# Patient Record
Sex: MALE | Race: AMERICAN INDIAN OR ALASKA NATIVE
[De-identification: names, ages, dates, MRNs, and addresses within clinical notes are randomized per-mention and may not be internally consistent; named-entity substitution may affect disease eponyms.]

---

## 2022-06-14 ENCOUNTER — HOSPITAL ENCOUNTER (EMERGENCY)
Dept: HOSPITAL 5 - ED | Age: 52
LOS: 1 days | Discharge: HOME | End: 2022-06-15
Payer: SELF-PAY

## 2022-06-14 VITALS — SYSTOLIC BLOOD PRESSURE: 150 MMHG | DIASTOLIC BLOOD PRESSURE: 96 MMHG

## 2022-06-14 DIAGNOSIS — Z79.899: ICD-10-CM

## 2022-06-14 DIAGNOSIS — S61.012A: Primary | ICD-10-CM
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
## 2022-06-14 PROCEDURE — 99283 EMERGENCY DEPT VISIT LOW MDM: CPT

## 2022-06-14 PROCEDURE — 96372 THER/PROPH/DIAG INJ SC/IM: CPT

## 2022-06-14 PROCEDURE — 90471 IMMUNIZATION ADMIN: CPT

## 2022-06-14 PROCEDURE — 90715 TDAP VACCINE 7 YRS/> IM: CPT

## 2022-06-14 NOTE — XRAY REPORT
XR hand 3+V LT



INDICATION / CLINICAL INFORMATION:

injury to lateral hand.



COMPARISON: 

None available.



FINDINGS:



Thumb is bandaged and underlying laceration. No acute fracture.  Normal alignment.  Joint spaces are 
preserved. No destructive osseous lesion or suspicious periosteal reaction.



Impression:

1.No fracture of thumbseen.



Signer Name: Ruslan Cobian MD 

Signed: 6/14/2022 6:48 PM

Workstation Name: [id number]

## 2022-06-15 NOTE — EMERGENCY DEPARTMENT REPORT
ED General Adult HPI





- General


Chief complaint: Wound/Laceration


Stated complaint: LT THUMB LACERATION


Time Seen by Provider: 06/15/22 01:17


Source: patient


Mode of arrival: Ambulatory


Limitations: No Limitations





- History of Present Illness


Initial comments: 


Patient is a right-handed 52-year-old ansari who presents for left posterior 

thumb laceration.  States he was sawing and thumb was caught by a circular blade

causing laceration.  Bleeding was controlled via direct pressure self applied.  

Patient now complains of 5/10 pain with movement.  There is no paralysis no 

deformity.  Patient drove self to ED incident happened approximately 6 hours 

ago.  Last tetanus status unknown.  Patient denies other injury.





Severity scale (0 -10): 8





- Related Data


                                  Previous Rx's











 Medication  Instructions  Recorded  Last Taken  Type


 


cephALEXin [Keflex] 500 mg PO Q8H 7 Days #21 cap 06/15/22 Unknown Rx


 


traMADoL [Ultram] 50 mg PO Q6HR PRN #12 tablet 06/15/22 Unknown Rx











                                    Allergies











Allergy/AdvReac Type Severity Reaction Status Date / Time


 


No Known Allergies Allergy   Verified 06/14/22 18:13














ED Review of Systems


ROS: 


Stated complaint: LT THUMB LACERATION


Other details as noted in HPI





Constitutional: denies: chills, fever


Eyes: denies: eye pain, eye discharge, vision change


ENT: denies: ear pain, throat pain


Respiratory: denies: cough, shortness of breath, wheezing


Cardiovascular: denies: chest pain, palpitations


Endocrine: no symptoms reported


Gastrointestinal: denies: abdominal pain, nausea, diarrhea


Genitourinary: denies: urgency, dysuria


Musculoskeletal: other (thumb laceration )


Skin: other (Laceration posterior left thumb).  denies: rash, lesions


Neurological: denies: headache, weakness, paresthesias, vertigo


Psychiatric: denies: anxiety, depression


Hematological/Lymphatic: denies: easy bleeding, easy bruising





ED Past Medical Hx





- Past Medical History


Previous Medical History?: No





- Surgical History


Past Surgical History?: No





- Social History


Smoking Status: Never Smoker





- Medications


Home Medications: 


                                Home Medications











 Medication  Instructions  Recorded  Confirmed  Last Taken  Type


 


cephALEXin [Keflex] 500 mg PO Q8H 7 Days #21 cap 06/15/22  Unknown Rx


 


traMADoL [Ultram] 50 mg PO Q6HR PRN #12 tablet 06/15/22  Unknown Rx














ED Physical Exam





- General


Limitations: No Limitations


General appearance: alert, in no apparent distress





- Head


Head exam: Present: atraumatic, normocephalic





- Eye


Eye exam: Present: EOMI


Pupils: Present: normal accommodation





- ENT


ENT exam: Present: mucous membranes moist





- Neck


Neck exam: Present: normal inspection, tenderness, lymphadenopathy





- Respiratory


Respiratory exam: Present: normal lung sounds bilaterally.  Absent: respiratory 

distress, wheezes, stridor





- Cardiovascular


Cardiovascular Exam: Present: regular rate, normal rhythm, normal heart sounds. 

 Absent: systolic murmur, diastolic murmur, rubs, gallop





- GI/Abdominal


GI/Abdominal exam: Present: soft, normal bowel sounds.  Absent: distended, 

tenderness





- Rectal


Rectal exam: Present: deferred





- Extremities Exam


Extremities exam: Present: full ROM, normal capillary refill





- Expanded Upper Extremity Exam


  ** Left


Hand Wrist exam: Present: full ROM, laceration (2 cm posterior left thumb base).

  Absent: ecchymosis, deformity, crepidus, dislocation, amputation, nail 

avulsion, subungual hematoma


Neuro motor exam: Present: wrist extension intact, thumb opposition intact, 

thumb IP flexion intact, thumb adduction intact, fingers 2-5 abduction intact


Neurosensory exam: Present: radial nerve intact


Vascular: Present: normal capillary refill





- Back Exam


Back exam: Present: normal inspection, full ROM.  Absent: CVA tenderness (R), 

CVA tenderness (L)





- Neurological Exam


Neurological exam: Present: alert, oriented X3, CN II-XII intact, normal gait, 

reflexes normal.  Absent: motor sensory deficit





- Expanded Neurological Exam


  ** Expanded


Patient oriented to: Present: person, place, time


Speech: Present: fluid speech


Motor strength exam: RUE: 5, LUE: 5, RLE: 5, LLE: 5


Best Eye Response (Benigno): (4) open spontaneously


Best Motor Response (Odell): (6) obeys commands


Best Verbal Response (Benigno): (5) oriented


Odell Total: 15





- Psychiatric


Psychiatric exam: Present: normal affect, normal mood





- Skin


Skin exam: Present: warm, dry, intact, normal color.  Absent: rash





ED Course





                                   Vital Signs











  06/14/22





  18:09


 


Temperature 98.4 F


 


Pulse Rate 98 H


 


Respiratory 16





Rate 


 


Blood Pressure 150/96


 


O2 Sat by Pulse 97





Oximetry 














- Laceration /Wound Repair


  ** Right Posterior Hand


Wound Location: upper extremity (Right posterior thumb base laceration 1 cm 

superficial)


Wound Length (cm): 1


Wound's Depth, Shape: superficial


Wound Explored: clean


Irrigated w/ Saline (ccs): 50


Betadine Prep?: Yes


Anesthesia: 1% Lidocaine


Volume Anesthetic (ccs): 2


Wound Debrided: Minimal dirt debris


Wound Repaired With: sutures


Suture Size/Type: 4:0, nylon


Number of Sutures: 6 (Running)


Layer Closure?: No


Sterile Dressing Applied?: Yes


Progress: 


Left posterior thumb laceration at base of thumb no nerve muscle or tendon 

damage range of motion is intact there is no nail damage CRT is less than 3 

seconds distal pulses are intact.  Site cleaned with Betadine solution.  Seizure

 1% lidocaine x2 cc.  Anesthesia is achieved.  Wound irrigated with 50 cc 

sterile saline.  Wound closed with 4-0 nylon times 6 sutures running.  Edges are

 well approximated all bleeding is controlled patient tolerated procedure with 

minimal distress.  Patient given wound care instructions including follow-up 

primary care in 2 days for wound check.  7 to 10 days for suture removal.








ED Medical Decision Making





- Radiology Data


Radiology results: report reviewed, image reviewed


XR hand 3+V LT  


 


 INDICATION / CLINICAL INFORMATION:  


 injury to lateral hand.  


 


 COMPARISON:   


 None available.  


 


 FINDINGS:  


 


 Thumb is bandaged and underlying laceration. No acute fracture.  Normal 

alignment.  Joint spaces 


are preserved. No destructive osseous lesion or suspicious periosteal reaction. 

 


 


 Impression:  


 1.No fracture of thumbseen.  


 


 Signer Name: Ruslan Cobian MD   


 Signed: 6/14/2022 6:48 PM  


 Workstation Name: [id number]   


 


 


Transcribed By:   


Dictated By: Ruslan Cobian MD  


Electronically Authenticated By: Ruslan Cobian MD    


Signed Date/Time: 06/14/22 1848                                


 


 


 


DD/DT: 06/14/22 1847                                                            

  


TD/TT:


























- Medical Decision Making


Thumb laceration see procedure note.  All bleeding is controlled plan DC to home

 follow-up primary care doctor in 2 days for wound check.  7 to 10 days for 

suture removal.  Return to emergency department should symptoms worsen or are 

not symptoms of infection.  Patient DC'd home with prescriptions at this time in

 stable condition.





Critical care attestation.: 


If time is entered above; I have spent that time in minutes in the direct care 

of this critically ill patient, excluding procedure time.








ED Disposition


Clinical Impression: 


Laceration of thumb, left


Qualifiers:


 Encounter type: initial encounter Damage to nail status: without damage Foreign

 body presence: without foreign body Qualified Code(s): S61.012A - Laceration 

without foreign body of left thumb without damage to nail, initial encounter





Disposition: 01 HOME / SELF CARE / HOMELESS


Is pt being admited?: No


Does the pt Need Aspirin: No


Condition: Stable


Instructions:  Laceration Care, Adult, Sutures, Staples, or Adhesive Wound 

Closure, Easy-to-Read


Additional Instructions: 


Take medications as prescribed, follow-up with your primary care doctor in 2 

days for wound check and 7 to 10 days for suture removal.  Return to emergency 

department should symptoms worsen.


Prescriptions: 


cephALEXin [Keflex] 500 mg PO Q8H 7 Days #21 cap


traMADoL [Ultram] 50 mg PO Q6HR PRN #12 tablet


 PRN Reason: Pain


Referrals: 


JANIYA RODRÍGUEZ MD [Staff Physician] - 3-5 Days


Forms:  Work/School Release Form(ED)


Time of Disposition: 02:10